# Patient Record
Sex: MALE | Race: ASIAN | NOT HISPANIC OR LATINO | Employment: FULL TIME | URBAN - METROPOLITAN AREA
[De-identification: names, ages, dates, MRNs, and addresses within clinical notes are randomized per-mention and may not be internally consistent; named-entity substitution may affect disease eponyms.]

---

## 2024-06-24 ENCOUNTER — PATIENT MESSAGE (OUTPATIENT)
Dept: INTERNAL MEDICINE | Facility: CLINIC | Age: 49
End: 2024-06-24

## 2024-06-24 ENCOUNTER — OFFICE VISIT (OUTPATIENT)
Dept: INTERNAL MEDICINE | Facility: CLINIC | Age: 49
End: 2024-06-24
Payer: COMMERCIAL

## 2024-06-24 VITALS
DIASTOLIC BLOOD PRESSURE: 60 MMHG | BODY MASS INDEX: 19.86 KG/M2 | WEIGHT: 105.19 LBS | OXYGEN SATURATION: 99 % | HEIGHT: 61 IN | SYSTOLIC BLOOD PRESSURE: 94 MMHG | HEART RATE: 74 BPM

## 2024-06-24 DIAGNOSIS — K20.80 ESOPHAGITIS, LOS ANGELES GRADE B: ICD-10-CM

## 2024-06-24 DIAGNOSIS — Z00.00 ANNUAL PHYSICAL EXAM: Primary | ICD-10-CM

## 2024-06-24 DIAGNOSIS — Z23 NEED FOR TDAP VACCINATION: ICD-10-CM

## 2024-06-24 DIAGNOSIS — Z00.00 ENCOUNTER FOR PREVENTIVE CARE: ICD-10-CM

## 2024-06-24 DIAGNOSIS — F51.01 PRIMARY INSOMNIA: ICD-10-CM

## 2024-06-24 DIAGNOSIS — F41.1 GENERALIZED ANXIETY DISORDER: ICD-10-CM

## 2024-06-24 DIAGNOSIS — Z76.89 ESTABLISHING CARE WITH NEW DOCTOR, ENCOUNTER FOR: ICD-10-CM

## 2024-06-24 DIAGNOSIS — Z12.11 SCREENING FOR MALIGNANT NEOPLASM OF COLON: ICD-10-CM

## 2024-06-24 DIAGNOSIS — J38.3 VOCAL CORD GRANULOMA: ICD-10-CM

## 2024-06-24 DIAGNOSIS — Z12.5 SCREENING FOR MALIGNANT NEOPLASM OF PROSTATE: ICD-10-CM

## 2024-06-24 PROCEDURE — 99999 PR PBB SHADOW E&M-EST. PATIENT-LVL IV: CPT | Mod: PBBFAC,,, | Performed by: INTERNAL MEDICINE

## 2024-06-24 PROCEDURE — 99204 OFFICE O/P NEW MOD 45 MIN: CPT | Mod: 25,S$GLB,, | Performed by: INTERNAL MEDICINE

## 2024-06-24 PROCEDURE — 3008F BODY MASS INDEX DOCD: CPT | Mod: CPTII,S$GLB,, | Performed by: INTERNAL MEDICINE

## 2024-06-24 PROCEDURE — 1159F MED LIST DOCD IN RCRD: CPT | Mod: CPTII,S$GLB,, | Performed by: INTERNAL MEDICINE

## 2024-06-24 PROCEDURE — 90471 IMMUNIZATION ADMIN: CPT | Mod: S$GLB,,, | Performed by: INTERNAL MEDICINE

## 2024-06-24 PROCEDURE — 90715 TDAP VACCINE 7 YRS/> IM: CPT | Mod: S$GLB,,, | Performed by: INTERNAL MEDICINE

## 2024-06-24 PROCEDURE — 3078F DIAST BP <80 MM HG: CPT | Mod: CPTII,S$GLB,, | Performed by: INTERNAL MEDICINE

## 2024-06-24 PROCEDURE — 3074F SYST BP LT 130 MM HG: CPT | Mod: CPTII,S$GLB,, | Performed by: INTERNAL MEDICINE

## 2024-06-24 RX ORDER — MIRTAZAPINE 7.5 MG/1
7.5 TABLET, FILM COATED ORAL NIGHTLY
Qty: 30 TABLET | Refills: 11 | Status: SHIPPED | OUTPATIENT
Start: 2024-06-24 | End: 2025-06-24

## 2024-06-24 RX ORDER — FAMOTIDINE 20 MG/1
40 TABLET, FILM COATED ORAL 2 TIMES DAILY PRN
Qty: 180 TABLET | Refills: 3 | Status: SHIPPED | OUTPATIENT
Start: 2024-06-24 | End: 2024-06-24 | Stop reason: SDUPTHER

## 2024-06-24 RX ORDER — FAMOTIDINE 20 MG/1
40 TABLET, FILM COATED ORAL 2 TIMES DAILY PRN
Qty: 180 TABLET | Refills: 3 | Status: SHIPPED | OUTPATIENT
Start: 2024-06-24

## 2024-06-24 NOTE — PROGRESS NOTES
INTERNAL MEDICINE CLINIC    Initial Visit to Establish Care    PRESENTING HISTORY     Previous PCP: No, Primary Doctor    Chief Complaint/Reason for Visit:     Chief Complaint   Patient presents with    Establish Care      History of Present Illness & ROS : Mr. Chato Reyes is a 48 y.o. male.      Review of Systems:  Review of Systems   Constitutional:  Negative for chills and fever.   Respiratory:  Negative for shortness of breath.    Cardiovascular:  Negative for chest pain.   Gastrointestinal:  Positive for heartburn. Negative for abdominal pain.   Musculoskeletal:  Negative for joint pain.   Psychiatric/Behavioral:  Negative for depression. The patient is nervous/anxious and has insomnia.        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       Past Surgical History:   Procedure Laterality Date    EYE SURGERY  2014 right  & 2016 left    Cataract surgery       Family History   Problem Relation Name Age of Onset    Diabetes [facility]     Hearing loss Father Amy     Hypertension Father Amy        Social History     Socioeconomic History    Marital status:    Tobacco Use    Smoking status: Never     Passive exposure: Never    Smokeless tobacco: Never   Substance and Sexual Activity    Alcohol use: Not Currently     Comment: Stopped consuming since Apr 2019    Drug use: Never    Sexual activity: Yes     Partners: Female     Birth control/protection: None   Social History Narrative    IT worker    Moved from [facility] to  2023.    : Cynthia    1 boy (14 years old as of 6/2024)     Social Determinants of Health     Financial Resource Strain: Low Risk  (6/17/2024)    Overall Financial Resource Strain (CARDIA)     Difficulty of Paying Living Expenses: Not hard at all   Food  Insecurity: No Food Insecurity (6/17/2024)    Hunger Vital Sign     Worried About Running Out of Food in the Last Year: Never true     Ran Out of Food in the Last Year: Never true   Physical Activity: Sufficiently Active (6/17/2024)    Exercise Vital Sign     Days of Exercise per Week: 5 days     Minutes of Exercise per Session: 60 min   Stress: Stress Concern Present (6/17/2024)    [employer] - Occupational Stress Questionnaire     Feeling of Stress : To some extent   Housing Stability: Unknown (6/17/2024)    Housing Stability Vital Sign     Unable to Pay for Housing in the Last Year: No       MEDICATIONS & ALLERGIES:     No current outpatient medications on file prior to visit.     No current facility-administered medications on file prior to visit.        Review of patient's allergies indicates:  No Known Allergies    Medications Reconciliation:   I have reconciled the patient's home medications with the patient/family. I have updated all changes.  Refer to After-Visit Medication List.    OBJECTIVE:     Vital Signs:  Vitals:    06/24/24 1511   BP: 94/60   Pulse: 74     Wt Readings from Last 3 Encounters:   06/24/24 1511 47.7 kg (105 lb 2.6 oz)     Body mass index is 19.85 kg/m².     Physical Exam:  General: Well developed, well nourished. No distress.  HEENT: Head is normocephalic, atraumatic; ears are normal.    Eyes: Clear conjunctiva.  Neck: Supple, symmetrical neck; trachea midline.  Lungs: Clear to auscultation bilaterally and normal respiratory effort.  Cardiovascular: Heart with regular rate and rhythm.    Extremities: No LE edema.    Abdomen: Abdomen is soft, non-tender non-distended with normal bowel sounds.  Musculoskeletal: Normal gait.   Genital:  Normal penis. Foreskin retractable.   No rash in the genital area.  Scrotum and epididymis normal. No inguinal hernia.  No inguinal nodes.   Rectal: No perianal lesions or rash. Digital exam: prostate 15 g, normal rectum.  Lymph  Nodes: No cervical, supraclavicular or axillary adenopathy.  Psychiatric: Normal affect. Alert.      ASSESSMENT & PLAN:   New patient who has not seen Primary Care Provider at [facility] for the past 3 years.  Patient is new to me. Medical, surgical, social, medication and allergy histories were obtained during this visit.    Annual phEstablishing care with new doctor, encounter forysical exam  Encounter for preventive care    - Reviewed and updated past and current medical problems.  Discussed treatment of current medical problems    -     HIV 1/2 Ag/Ab (4th Gen); Future; Expected date: 06/24/2024  -     Hepatitis C Antibody; Future; Expected date: 06/24/2024  -     Comprehensive Metabolic Panel; Future; Expected date: 06/24/2024  -     CBC Auto Differential; Future; Expected date: 06/24/2024  -     TSH; Future; Expected date: 06/24/2024  -     Hemoglobin A1C; Future; Expected date: 06/24/2024  -     Lipid Panel; Future; Expected date: 06/24/2024  -     PSA, Screening; Future; Expected date: 06/24/2024    Esophagitis, [place] grade B  EGD 4-9-2021 in [place]    -     Ambulatory referral/consult to Endo Procedure ; Future; Expected date: 06/25/2024  -     famotidine (PEPCID) 20 MG tablet; Take 2 tablets (40 mg total) by mouth 2 (two) times daily as needed for Heartburn.  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   Resolved per patient. No voice issues now.    Primary insomnia  Generalized anxiety disorder     Rx:  -     mirtazapine (REMERON) 7.5 MG Tab; Take 1 tablet (7.5 mg total) by mouth every evening.  Dispense: 30 tablet; Refill: 11        Titrate dose for effectiveness.    Screening for malignant neoplasm of colon  -     Ambulatory referral/consult to Endo Procedure ; Future; Expected date: 06/25/2024 for EGD and C-scope.    Preventive Health Maintenance:    Need for Tdap vaccination  -     (In Office Administered) Tdap  Vaccine    Return to Clinic for Follow Up with me:   1 Year.      Scheduled Follow-up :  Future Appointments   Date Time Provider Department Center   6/28/2024  8:20 AM LAB, APPOINTMENT [facility] LAB IM Parmjit y PCW   9/5/2024 10:40 AM PRE-ADMIT NURSE 2, ENDO -[facility]         After Visit Medication List :     Medication List            Accurate as of June 24, 2024 11:59 PM. If you have any questions, ask your nurse or doctor.                START taking these medications      cholecalciferol (vitamin D3) 50 mcg (2,000 unit) Cap capsule  Commonly known as: VITAMIN D3  Take 1 capsule (2,000 Units total) by mouth once daily.  Started by: Kamaljit Allison MD     famotidine 20 MG tablet  Commonly known as: PEPCID  Take 2 tablets (40 mg total) by mouth 2 (two) times daily as needed for Heartburn.  Started by: Kamaljit Allison MD     mirtazapine 7.5 MG Tab  Commonly known as: REMERON  Take 1 tablet (7.5 mg total) by mouth every evening.  Started by: Kamaljit Allison MD               Where to Get Your Medications        These medications were sent to [id number] - [place] - [address]  [address]  [address] [place] [address]      Hours: 24-hours Phone: [phone]   famotidine 20 MG tablet  mirtazapine 7.5 MG Tab       You can get these medications from any pharmacy    You don't need a prescription for these medications  cholecalciferol (vitamin D3) 50 mcg (2,000 unit) Cap capsule         Signing Physician:  Kamaljit Allison MD

## 2024-06-25 RX ORDER — ACETAMINOPHEN 500 MG
2000 TABLET ORAL DAILY
COMMUNITY
Start: 2024-06-25

## 2024-06-26 ENCOUNTER — PATIENT MESSAGE (OUTPATIENT)
Dept: INTERNAL MEDICINE | Facility: CLINIC | Age: 49
End: 2024-06-26
Payer: COMMERCIAL

## 2024-06-27 ENCOUNTER — PATIENT MESSAGE (OUTPATIENT)
Dept: INTERNAL MEDICINE | Facility: CLINIC | Age: 49
End: 2024-06-27
Payer: COMMERCIAL

## 2024-06-28 ENCOUNTER — LAB VISIT (OUTPATIENT)
Dept: LAB | Facility: HOSPITAL | Age: 49
End: 2024-06-28
Attending: INTERNAL MEDICINE
Payer: COMMERCIAL

## 2024-06-28 DIAGNOSIS — Z12.5 SCREENING FOR MALIGNANT NEOPLASM OF PROSTATE: ICD-10-CM

## 2024-06-28 DIAGNOSIS — Z76.89 ESTABLISHING CARE WITH NEW DOCTOR, ENCOUNTER FOR: ICD-10-CM

## 2024-06-28 DIAGNOSIS — Z00.00 ENCOUNTER FOR PREVENTIVE CARE: ICD-10-CM

## 2024-06-28 DIAGNOSIS — Z00.00 ANNUAL PHYSICAL EXAM: ICD-10-CM

## 2024-06-28 PROBLEM — E78.5 DYSLIPIDEMIA: Status: ACTIVE | Noted: 2024-06-28

## 2024-06-28 LAB
ALBUMIN SERPL BCP-MCNC: 3.9 G/DL (ref 3.5–5.2)
ALP SERPL-CCNC: 70 U/L (ref 55–135)
ALT SERPL W/O P-5'-P-CCNC: 10 U/L (ref 10–44)
ANION GAP SERPL CALC-SCNC: 10 MMOL/L (ref 8–16)
AST SERPL-CCNC: 20 U/L (ref 10–40)
BASOPHILS # BLD AUTO: 0.05 K/UL (ref 0–0.2)
BASOPHILS NFR BLD: 0.7 % (ref 0–1.9)
BILIRUB SERPL-MCNC: 0.3 MG/DL (ref 0.1–1)
BUN SERPL-MCNC: 13 MG/DL (ref 6–20)
CALCIUM SERPL-MCNC: 10.2 MG/DL (ref 8.7–10.5)
CHLORIDE SERPL-SCNC: 109 MMOL/L (ref 95–110)
CHOLEST SERPL-MCNC: 204 MG/DL (ref 120–199)
CHOLEST/HDLC SERPL: 5.2 {RATIO} (ref 2–5)
CO2 SERPL-SCNC: 24 MMOL/L (ref 23–29)
COMPLEXED PSA SERPL-MCNC: 0.87 NG/ML (ref 0–4)
CREAT SERPL-MCNC: 0.9 MG/DL (ref 0.5–1.4)
DIFFERENTIAL METHOD BLD: ABNORMAL
EOSINOPHIL # BLD AUTO: 0.3 K/UL (ref 0–0.5)
EOSINOPHIL NFR BLD: 4.3 % (ref 0–8)
ERYTHROCYTE [DISTWIDTH] IN BLOOD BY AUTOMATED COUNT: 12.9 % (ref 11.5–14.5)
EST. GFR  (NO RACE VARIABLE): >60 ML/MIN/1.73 M^2
ESTIMATED AVG GLUCOSE: 111 MG/DL (ref 68–131)
GLUCOSE SERPL-MCNC: 114 MG/DL (ref 70–110)
HBA1C MFR BLD: 5.5 % (ref 4–5.6)
HCT VFR BLD AUTO: 46.4 % (ref 40–54)
HCV AB SERPL QL IA: NORMAL
HDLC SERPL-MCNC: 39 MG/DL (ref 40–75)
HDLC SERPL: 19.1 % (ref 20–50)
HGB BLD-MCNC: 14.7 G/DL (ref 14–18)
HIV 1+2 AB+HIV1 P24 AG SERPL QL IA: NORMAL
IMM GRANULOCYTES # BLD AUTO: 0.01 K/UL (ref 0–0.04)
IMM GRANULOCYTES NFR BLD AUTO: 0.1 % (ref 0–0.5)
LDLC SERPL CALC-MCNC: 129.2 MG/DL (ref 63–159)
LYMPHOCYTES # BLD AUTO: 2.6 K/UL (ref 1–4.8)
LYMPHOCYTES NFR BLD: 38 % (ref 18–48)
MCH RBC QN AUTO: 28.8 PG (ref 27–31)
MCHC RBC AUTO-ENTMCNC: 31.7 G/DL (ref 32–36)
MCV RBC AUTO: 91 FL (ref 82–98)
MONOCYTES # BLD AUTO: 0.5 K/UL (ref 0.3–1)
MONOCYTES NFR BLD: 7.5 % (ref 4–15)
NEUTROPHILS # BLD AUTO: 3.4 K/UL (ref 1.8–7.7)
NEUTROPHILS NFR BLD: 49.4 % (ref 38–73)
NONHDLC SERPL-MCNC: 165 MG/DL
NRBC BLD-RTO: 0 /100 WBC
PLATELET # BLD AUTO: 316 K/UL (ref 150–450)
PMV BLD AUTO: 8.7 FL (ref 9.2–12.9)
POTASSIUM SERPL-SCNC: 4.6 MMOL/L (ref 3.5–5.1)
PROT SERPL-MCNC: 7.2 G/DL (ref 6–8.4)
RBC # BLD AUTO: 5.11 M/UL (ref 4.6–6.2)
SODIUM SERPL-SCNC: 143 MMOL/L (ref 136–145)
TRIGL SERPL-MCNC: 179 MG/DL (ref 30–150)
TSH SERPL DL<=0.005 MIU/L-ACNC: 1.49 UIU/ML (ref 0.4–4)
WBC # BLD AUTO: 6.94 K/UL (ref 3.9–12.7)

## 2024-06-28 PROCEDURE — 87389 HIV-1 AG W/HIV-1&-2 AB AG IA: CPT | Performed by: INTERNAL MEDICINE

## 2024-06-28 PROCEDURE — 84153 ASSAY OF PSA TOTAL: CPT | Performed by: INTERNAL MEDICINE

## 2024-06-28 PROCEDURE — 80053 COMPREHEN METABOLIC PANEL: CPT | Performed by: INTERNAL MEDICINE

## 2024-06-28 PROCEDURE — 84443 ASSAY THYROID STIM HORMONE: CPT | Performed by: INTERNAL MEDICINE

## 2024-06-28 PROCEDURE — 86803 HEPATITIS C AB TEST: CPT | Performed by: INTERNAL MEDICINE

## 2024-06-28 PROCEDURE — 85025 COMPLETE CBC W/AUTO DIFF WBC: CPT | Performed by: INTERNAL MEDICINE

## 2024-06-28 PROCEDURE — 80061 LIPID PANEL: CPT | Performed by: INTERNAL MEDICINE

## 2024-06-28 PROCEDURE — 36415 COLL VENOUS BLD VENIPUNCTURE: CPT | Performed by: INTERNAL MEDICINE

## 2024-06-28 PROCEDURE — 83036 HEMOGLOBIN GLYCOSYLATED A1C: CPT | Performed by: INTERNAL MEDICINE

## 2024-07-04 ENCOUNTER — PATIENT MESSAGE (OUTPATIENT)
Dept: INTERNAL MEDICINE | Facility: CLINIC | Age: 49
End: 2024-07-04
Payer: COMMERCIAL

## 2024-07-05 DIAGNOSIS — F41.1 GENERALIZED ANXIETY DISORDER: ICD-10-CM

## 2024-07-05 DIAGNOSIS — F51.01 PRIMARY INSOMNIA: ICD-10-CM

## 2024-07-05 RX ORDER — MIRTAZAPINE 7.5 MG/1
7.5 TABLET, FILM COATED ORAL NIGHTLY
Qty: 90 TABLET | Refills: 3 | Status: SHIPPED | OUTPATIENT
Start: 2024-07-24

## 2024-07-05 NOTE — TELEPHONE ENCOUNTER
mirtazapine (REMERON) 7.5 MG Tab (Future) 7.5 mg Oral Nightly 07/24/2024  -- --    90 tablet 3 ordered -- -- Kamaljit Allison MD              Dose, Route, Frequency: 7.5 mg, Oral, NightlyDx Associated: Ordered Date & Time: 07/05/2024 0957Start: 07/24/2024Pharmacy: [facility] #[id number] - [place], [place] - [address]Adh: Report       Ordering Department: [facility] INTERNAL MEDICINE  Order Details: Take 1 tablet (7.5 mg total) by mouth every evening. Dispense: 90 tablet, Refills: 3 ordered

## 2024-07-08 DIAGNOSIS — F51.01 PRIMARY INSOMNIA: ICD-10-CM

## 2024-07-08 DIAGNOSIS — F41.1 GENERALIZED ANXIETY DISORDER: ICD-10-CM

## 2024-07-08 RX ORDER — MIRTAZAPINE 7.5 MG/1
7.5 TABLET, FILM COATED ORAL NIGHTLY
Qty: 90 TABLET | Refills: 3 | Status: SHIPPED | OUTPATIENT
Start: 2024-07-08

## 2024-07-08 NOTE — TELEPHONE ENCOUNTER
mirtazapine (REMERON) 7.5 MG Tab 7.5 mg Oral Nightly 07/08/2024  -- --    90 tablet 3 ordered -- -- Kamaljit Allison MD              Dose, Route, Frequency: 7.5 mg, Oral, NightlyDx Associated: Ordered Date & Time: 07/08/2024 1730Start: 07/08/2024Pharmacy: [facility] #[id number] - [place], [place] - [address] AT [facility] [address]: Report             Change  Reorder  Discontinue  Ordering Department: [facility] INTERNAL MEDICINE  Order Details: Take 1 tablet (7.5 mg total) by mouth every evening. Dispense: 90 tablet, Refills: 3 ordered

## 2024-08-15 DIAGNOSIS — K20.80 ESOPHAGITIS, LOS ANGELES GRADE B: ICD-10-CM

## 2024-08-15 RX ORDER — FAMOTIDINE 40 MG/1
40 TABLET, FILM COATED ORAL 2 TIMES DAILY PRN
Qty: 180 TABLET | Refills: 3 | Status: SHIPPED | OUTPATIENT
Start: 2024-08-15

## 2024-08-15 NOTE — TELEPHONE ENCOUNTER
Care Due:                  Date            Visit Type   Department     Provider  --------------------------------------------------------------------------------                                NP -                              PRIMARY      [facility] INTERNAL  Last Visit: 06-      CARE ([facility])   MEDICINE       Kamaljit Allison  Next Visit: None Scheduled  None         None Found                                                            Last  Test          Frequency    Reason                     Performed    Due Date  --------------------------------------------------------------------------------    Vitamin D...  12 months..  cholecalciferol,.........  Not Found    Overdue    [employer] Embedded Care Due Messages. Reference number: [id number].   8/15/2024 5:28:02 PM CDT

## 2024-09-05 ENCOUNTER — CLINICAL SUPPORT (OUTPATIENT)
Dept: ENDOSCOPY | Facility: HOSPITAL | Age: 49
End: 2024-09-05
Attending: INTERNAL MEDICINE
Payer: COMMERCIAL

## 2024-09-05 ENCOUNTER — TELEPHONE (OUTPATIENT)
Dept: ENDOSCOPY | Facility: HOSPITAL | Age: 49
End: 2024-09-05

## 2024-09-05 VITALS — WEIGHT: 105 LBS | BODY MASS INDEX: 19.83 KG/M2 | HEIGHT: 61 IN

## 2024-09-05 DIAGNOSIS — Z12.11 SCREENING FOR MALIGNANT NEOPLASM OF COLON: ICD-10-CM

## 2024-09-05 DIAGNOSIS — K20.80 ESOPHAGITIS, LOS ANGELES GRADE B: ICD-10-CM

## 2024-09-05 RX ORDER — SODIUM, POTASSIUM,MAG SULFATES 17.5-3.13G
1 SOLUTION, RECONSTITUTED, ORAL ORAL DAILY
Qty: 1 KIT | Refills: 0 | Status: SHIPPED | OUTPATIENT
Start: 2024-09-05 | End: 2024-09-07

## 2024-09-05 NOTE — TELEPHONE ENCOUNTER
----- Message from Kiara Glaser sent at 9/5/2024 12:38 PM CDT -----  Regarding: FW: sooner appt wanted  Contact: pt @ [phone]    ----- Message -----  From: Blanca Abdalla  Sent: 9/5/2024  12:24 PM CDT  To: [facility] Endoscopy Schedulers  Subject: sooner appt wanted                               Pt is wanting be scheduled for procedure sooner with any provider. Please call to advise further. Thank you for all you are doing

## 2024-09-05 NOTE — TELEPHONE ENCOUNTER
Spoke to patient to reschedule procedure(s) Colonoscopy/EGD       Physician to perform procedure(s) Dr. ANGELIA Bradley  Date of Procedure (s) 9/10/24  Arrival Time 9:45 AM  Time of Procedure(s) 10:45 AM   Location of Procedure(s) [place] 4th Floor  Type of Rx Prep sent to patient: Suprep  Instructions provided to patient via [facility]    Patient was informed on the following information and verbalized understanding. Screening questionnaire reviewed with patient and complete. If procedure requires anesthesia, a responsible adult needs to be present to accompany the patient home, patient cannot drive after receiving anesthesia. Appointment details are tentative, especially check-in time. Patient will receive a prep-op call 7 days prior to confirm check-in time for procedure. If applicable the patient should contact their pharmacy to verify Rx for procedure prep is ready for pick-up. Patient was advised to call the scheduling department at [phone] if pharmacy states no Rx is available. Patient was advised to call the endoscopy scheduling department if any questions or concerns arise.      [facility] Endoscopy Scheduling Department

## 2024-09-05 NOTE — TELEPHONE ENCOUNTER
Spoke to Argelia to schedule procedure(s) Colonoscopy/EGD       Physician to perform procedure(s) Dr. JULIO CÉSAR Hopkins  Date of Procedure (s) 9/12/24  Arrival Time 7:00 AM  Time of Procedure(s) 8:00 AM   Location of Procedure(s) [place] 2nd Floor  Type of Rx Prep sent to patient: Suprep  Instructions provided to patient via [facility]    Patient was informed on the following information and verbalized understanding. Screening questionnaire reviewed with patient and complete. If procedure requires anesthesia, a responsible adult needs to be present to accompany the patient home, patient cannot drive after receiving anesthesia. Appointment details are tentative, especially check-in time. Patient will receive a prep-op call 7 days prior to confirm check-in time for procedure. If applicable the patient should contact their pharmacy to verify Rx for procedure prep is ready for pick-up. Patient was advised to call the scheduling department at [phone] if pharmacy states no Rx is available. Patient was advised to call the endoscopy scheduling department if any questions or concerns arise.      [facility] Endoscopy Scheduling Department

## 2024-10-22 DIAGNOSIS — F41.1 GENERALIZED ANXIETY DISORDER: ICD-10-CM

## 2024-10-22 DIAGNOSIS — F51.01 PRIMARY INSOMNIA: ICD-10-CM

## 2024-10-22 RX ORDER — MIRTAZAPINE 7.5 MG/1
7.5 TABLET, FILM COATED ORAL NIGHTLY
Qty: 90 TABLET | Refills: 3 | Status: SHIPPED | OUTPATIENT
Start: 2024-10-22

## 2024-10-22 NOTE — TELEPHONE ENCOUNTER
Care Due:                  Date            Visit Type   Department     Provider  --------------------------------------------------------------------------------                                NP -                              PRIMARY      [facility] INTERNAL  Last Visit: 06-      CARE ([facility])   MEDICINE       Kamaljit Allison  Next Visit: None Scheduled  None         None Found                                                            Last  Test          Frequency    Reason                     Performed    Due Date  --------------------------------------------------------------------------------    Vitamin D...  12 months..  cholecalciferol,.........  Not Found    Overdue    [employer] Embedded Care Due Messages. Reference number: [id number].   10/22/2024 11:00:54 AM CDT

## 2024-12-16 ENCOUNTER — PATIENT MESSAGE (OUTPATIENT)
Dept: ADMINISTRATIVE | Facility: HOSPITAL | Age: 49
End: 2024-12-16
Payer: COMMERCIAL

## 2024-12-18 ENCOUNTER — PATIENT OUTREACH (OUTPATIENT)
Dept: ADMINISTRATIVE | Facility: HOSPITAL | Age: 49
End: 2024-12-18
Payer: COMMERCIAL

## 2024-12-18 NOTE — PROGRESS NOTES
Chart reviewed and updated. Reconciled immunizations.  Colonoscopy never done forwarding for Fit kit.      Marie CANO, Medical Assistant  Panel Care Coordinator  [facility]

## 2025-01-14 ENCOUNTER — PATIENT MESSAGE (OUTPATIENT)
Dept: INTERNAL MEDICINE | Facility: CLINIC | Age: 50
End: 2025-01-14
Payer: COMMERCIAL

## 2025-01-15 ENCOUNTER — TELEPHONE (OUTPATIENT)
Dept: INTERNAL MEDICINE | Facility: CLINIC | Age: 50
End: 2025-01-15
Payer: COMMERCIAL

## 2025-01-15 DIAGNOSIS — K20.80 ESOPHAGITIS, LOS ANGELES GRADE B: Primary | ICD-10-CM

## 2025-01-15 DIAGNOSIS — Z12.11 SCREENING FOR MALIGNANT NEOPLASM OF COLON: ICD-10-CM

## 2025-01-15 NOTE — TELEPHONE ENCOUNTER
I have placed a referral for screening colonoscopy and EGD (upper and lower endoscopy)    Please call the Endoscopy Department to schedule your appointment:    [facility] [place] ([facility])  [facility]                                                              [phone]

## 2025-02-01 DIAGNOSIS — F41.1 GENERALIZED ANXIETY DISORDER: ICD-10-CM

## 2025-02-01 DIAGNOSIS — F51.01 PRIMARY INSOMNIA: ICD-10-CM

## 2025-02-01 RX ORDER — MIRTAZAPINE 7.5 MG/1
7.5 TABLET, FILM COATED ORAL NIGHTLY
Qty: 90 TABLET | Refills: 1 | Status: SHIPPED | OUTPATIENT
Start: 2025-02-01

## 2025-02-01 NOTE — TELEPHONE ENCOUNTER
Care Due:                  Date            Visit Type   Department     Provider  --------------------------------------------------------------------------------                                NP -                              PRIMARY      [facility] INTERNAL  Last Visit: 06-      CARE ([facility])   MEDICINE       Kamaljit Allison  Next Visit: None Scheduled  None         None Found                                                            Last  Test          Frequency    Reason                     Performed    Due Date  --------------------------------------------------------------------------------    Vitamin D...  12 months..  cholecalciferol,.........  Not Found    Overdue    [employer] Embedded Care Due Messages. Reference number: [id number].   2/01/2025 12:19:21 PM CST

## 2025-02-17 DIAGNOSIS — K20.80 ESOPHAGITIS, LOS ANGELES GRADE B: ICD-10-CM

## 2025-02-18 RX ORDER — FAMOTIDINE 40 MG/1
40 TABLET, FILM COATED ORAL 2 TIMES DAILY PRN
Qty: 180 TABLET | Refills: 3 | Status: SHIPPED | OUTPATIENT
Start: 2025-02-18

## 2025-02-18 NOTE — TELEPHONE ENCOUNTER
No care due was identified.  [facility] Embedded Care Due Messages. Reference number: [id number].   2/17/2025 7:52:37 PM CST

## 2025-03-14 ENCOUNTER — PATIENT MESSAGE (OUTPATIENT)
Dept: INTERNAL MEDICINE | Facility: CLINIC | Age: 50
End: 2025-03-14
Payer: COMMERCIAL

## 2025-03-14 ENCOUNTER — CLINICAL SUPPORT (OUTPATIENT)
Dept: ENDOSCOPY | Facility: HOSPITAL | Age: 50
End: 2025-03-14
Attending: INTERNAL MEDICINE
Payer: COMMERCIAL

## 2025-03-14 ENCOUNTER — TELEPHONE (OUTPATIENT)
Dept: GASTROENTEROLOGY | Facility: CLINIC | Age: 50
End: 2025-03-14
Payer: COMMERCIAL

## 2025-03-14 VITALS — WEIGHT: 110 LBS | BODY MASS INDEX: 20.77 KG/M2 | HEIGHT: 61 IN

## 2025-03-14 DIAGNOSIS — Z12.11 SCREENING FOR MALIGNANT NEOPLASM OF COLON: ICD-10-CM

## 2025-03-14 DIAGNOSIS — K20.80 ESOPHAGITIS, LOS ANGELES GRADE B: ICD-10-CM

## 2025-03-14 NOTE — TELEPHONE ENCOUNTER
----- Message from Mauricio sent at 3/14/2025  4:42 PM CDT -----  Regarding: Cancel Procedure  Contact: [phone]  Hi,Who called:The pt Reason:Pt called to cancel procedure for 03/19/25Provider's name:Dr. HopkinsAdditional Information:Thank you.

## 2025-03-14 NOTE — TELEPHONE ENCOUNTER
----- Message from Mauricio sent at 3/14/2025  4:42 PM CDT -----  Regarding: Cancel Procedure  Contact: [phone]  Hi,Who called:The pt Reason:Pt called to cancel procedure for 03/19/25Provider's name:Dr. HopkinsAdditional Information:Thank you.

## 2025-03-17 ENCOUNTER — TELEPHONE (OUTPATIENT)
Dept: ENDOSCOPY | Facility: HOSPITAL | Age: 50
End: 2025-03-17
Payer: COMMERCIAL

## 2025-03-17 NOTE — TELEPHONE ENCOUNTER
Spoke to pt in regards to EGD on 3/19. Pt wants to cancel procedure due to cost. Procedure removed from schedule.

## 2025-05-01 DIAGNOSIS — F41.1 GENERALIZED ANXIETY DISORDER: ICD-10-CM

## 2025-05-01 DIAGNOSIS — F51.01 PRIMARY INSOMNIA: ICD-10-CM

## 2025-05-01 RX ORDER — MIRTAZAPINE 7.5 MG/1
7.5 TABLET, FILM COATED ORAL NIGHTLY
Qty: 90 TABLET | Refills: 0 | Status: SHIPPED | OUTPATIENT
Start: 2025-05-01

## 2025-05-02 NOTE — TELEPHONE ENCOUNTER
Care Due:                  Date            Visit Type   Department     Provider  --------------------------------------------------------------------------------                                NP -                              PRIMARY      [facility] INTERNAL  Last Visit: 06-      CARE ([facility])   GRISELDA Allison                               -                              PRIMARY      [facility] INTERNAL  Next Visit: 06-      CARE ([facility])   [facility]       Kamaljit Allison                                                            Last  Test          Frequency    Reason                     Performed    Due Date  --------------------------------------------------------------------------------    CBC.........  12 months..  mirtazapine..............  06- 06-    CMP.........  12 months..  cholecalciferol,,          06- 06-                             famotidine, mirtazapine..    Vitamin D...  12 months..  cholecalciferol,.........  Not Found    Overdue    [employer] Embedded Care Due Messages. Reference number: [id number].   5/01/2025 8:56:54 PM CDT

## 2025-06-30 ENCOUNTER — OFFICE VISIT (OUTPATIENT)
Dept: INTERNAL MEDICINE | Facility: CLINIC | Age: 50
End: 2025-06-30
Payer: COMMERCIAL

## 2025-06-30 ENCOUNTER — LAB VISIT (OUTPATIENT)
Dept: LAB | Facility: HOSPITAL | Age: 50
End: 2025-06-30
Attending: INTERNAL MEDICINE
Payer: COMMERCIAL

## 2025-06-30 ENCOUNTER — PATIENT MESSAGE (OUTPATIENT)
Dept: INTERNAL MEDICINE | Facility: CLINIC | Age: 50
End: 2025-06-30

## 2025-06-30 ENCOUNTER — RESULTS FOLLOW-UP (OUTPATIENT)
Dept: INTERNAL MEDICINE | Facility: CLINIC | Age: 50
End: 2025-06-30

## 2025-06-30 VITALS
BODY MASS INDEX: 20.47 KG/M2 | WEIGHT: 108.44 LBS | DIASTOLIC BLOOD PRESSURE: 82 MMHG | OXYGEN SATURATION: 99 % | HEART RATE: 66 BPM | HEIGHT: 61 IN | SYSTOLIC BLOOD PRESSURE: 100 MMHG

## 2025-06-30 DIAGNOSIS — K20.80 ESOPHAGITIS, LOS ANGELES GRADE B: ICD-10-CM

## 2025-06-30 DIAGNOSIS — Z00.00 ANNUAL PHYSICAL EXAM: Primary | ICD-10-CM

## 2025-06-30 DIAGNOSIS — Z12.5 SCREENING FOR MALIGNANT NEOPLASM OF PROSTATE: ICD-10-CM

## 2025-06-30 DIAGNOSIS — F51.01 PRIMARY INSOMNIA: ICD-10-CM

## 2025-06-30 DIAGNOSIS — A04.8 H. PYLORI INFECTION: ICD-10-CM

## 2025-06-30 DIAGNOSIS — Z12.11 SCREENING FOR MALIGNANT NEOPLASM OF COLON: ICD-10-CM

## 2025-06-30 DIAGNOSIS — J38.3 VOCAL CORD GRANULOMA: ICD-10-CM

## 2025-06-30 DIAGNOSIS — E78.2 MIXED HYPERLIPIDEMIA: ICD-10-CM

## 2025-06-30 DIAGNOSIS — F41.1 GENERALIZED ANXIETY DISORDER: ICD-10-CM

## 2025-06-30 DIAGNOSIS — Z00.00 ANNUAL PHYSICAL EXAM: ICD-10-CM

## 2025-06-30 LAB
ABSOLUTE EOSINOPHIL (OHS): 0.14 K/UL
ABSOLUTE MONOCYTE (OHS): 0.44 K/UL (ref 0.3–1)
ABSOLUTE NEUTROPHIL COUNT (OHS): 4.53 K/UL (ref 1.8–7.7)
ALBUMIN SERPL BCP-MCNC: 4.5 G/DL (ref 3.5–5.2)
ALP SERPL-CCNC: 63 UNIT/L (ref 40–150)
ALT SERPL W/O P-5'-P-CCNC: 14 UNIT/L (ref 10–44)
ANION GAP (OHS): 10 MMOL/L (ref 8–16)
AST SERPL-CCNC: 17 UNIT/L (ref 11–45)
BASOPHILS # BLD AUTO: 0.03 K/UL
BASOPHILS NFR BLD AUTO: 0.4 %
BILIRUB SERPL-MCNC: 0.5 MG/DL (ref 0.1–1)
BUN SERPL-MCNC: 9 MG/DL (ref 6–20)
CALCIUM SERPL-MCNC: 9.6 MG/DL (ref 8.7–10.5)
CHLORIDE SERPL-SCNC: 105 MMOL/L (ref 95–110)
CO2 SERPL-SCNC: 25 MMOL/L (ref 23–29)
CREAT SERPL-MCNC: 0.7 MG/DL (ref 0.5–1.4)
EAG (OHS): 105 MG/DL (ref 68–131)
ERYTHROCYTE [DISTWIDTH] IN BLOOD BY AUTOMATED COUNT: 13 % (ref 11.5–14.5)
GFR SERPLBLD CREATININE-BSD FMLA CKD-EPI: >60 ML/MIN/1.73/M2
GLUCOSE SERPL-MCNC: 89 MG/DL (ref 70–110)
HBA1C MFR BLD: 5.3 % (ref 4–5.6)
HBV CORE AB SERPL QL IA: NORMAL
HBV SURFACE AB SER-ACNC: <3 MIU/ML
HBV SURFACE AB SERPL IA-ACNC: NORMAL M[IU]/ML
HBV SURFACE AG SERPL QL IA: NORMAL
HCT VFR BLD AUTO: 42.3 % (ref 40–54)
HGB BLD-MCNC: 14 GM/DL (ref 14–18)
IMM GRANULOCYTES # BLD AUTO: 0.02 K/UL (ref 0–0.04)
IMM GRANULOCYTES NFR BLD AUTO: 0.3 % (ref 0–0.5)
LYMPHOCYTES # BLD AUTO: 2.39 K/UL (ref 1–4.8)
MCH RBC QN AUTO: 28.5 PG (ref 27–31)
MCHC RBC AUTO-ENTMCNC: 33.1 G/DL (ref 32–36)
MCV RBC AUTO: 86 FL (ref 82–98)
NUCLEATED RBC (/100WBC) (OHS): 0 /100 WBC
PLATELET # BLD AUTO: 317 K/UL (ref 150–450)
PMV BLD AUTO: 8.8 FL (ref 9.2–12.9)
POTASSIUM SERPL-SCNC: 4.4 MMOL/L (ref 3.5–5.1)
PROT SERPL-MCNC: 7.6 GM/DL (ref 6–8.4)
PSA SERPL-MCNC: 0.91 NG/ML
RBC # BLD AUTO: 4.91 M/UL (ref 4.6–6.2)
RELATIVE EOSINOPHIL (OHS): 1.9 %
RELATIVE LYMPHOCYTE (OHS): 31.7 % (ref 18–48)
RELATIVE MONOCYTE (OHS): 5.8 % (ref 4–15)
RELATIVE NEUTROPHIL (OHS): 59.9 % (ref 38–73)
SODIUM SERPL-SCNC: 140 MMOL/L (ref 136–145)
TSH SERPL-ACNC: 1.11 UIU/ML (ref 0.4–4)
WBC # BLD AUTO: 7.55 K/UL (ref 3.9–12.7)

## 2025-06-30 PROCEDURE — 36415 COLL VENOUS BLD VENIPUNCTURE: CPT

## 2025-06-30 PROCEDURE — 3008F BODY MASS INDEX DOCD: CPT | Mod: CPTII,S$GLB,, | Performed by: INTERNAL MEDICINE

## 2025-06-30 PROCEDURE — 86704 HEP B CORE ANTIBODY TOTAL: CPT

## 2025-06-30 PROCEDURE — 84443 ASSAY THYROID STIM HORMONE: CPT

## 2025-06-30 PROCEDURE — 87340 HEPATITIS B SURFACE AG IA: CPT

## 2025-06-30 PROCEDURE — 86677 HELICOBACTER PYLORI ANTIBODY: CPT

## 2025-06-30 PROCEDURE — 99999 PR PBB SHADOW E&M-EST. PATIENT-LVL III: CPT | Mod: PBBFAC,,, | Performed by: INTERNAL MEDICINE

## 2025-06-30 PROCEDURE — 83036 HEMOGLOBIN GLYCOSYLATED A1C: CPT

## 2025-06-30 PROCEDURE — 3079F DIAST BP 80-89 MM HG: CPT | Mod: CPTII,S$GLB,, | Performed by: INTERNAL MEDICINE

## 2025-06-30 PROCEDURE — 84153 ASSAY OF PSA TOTAL: CPT

## 2025-06-30 PROCEDURE — 85025 COMPLETE CBC W/AUTO DIFF WBC: CPT

## 2025-06-30 PROCEDURE — 99214 OFFICE O/P EST MOD 30 MIN: CPT | Mod: S$GLB,,, | Performed by: INTERNAL MEDICINE

## 2025-06-30 PROCEDURE — 86706 HEP B SURFACE ANTIBODY: CPT

## 2025-06-30 PROCEDURE — 80053 COMPREHEN METABOLIC PANEL: CPT

## 2025-06-30 PROCEDURE — 3074F SYST BP LT 130 MM HG: CPT | Mod: CPTII,S$GLB,, | Performed by: INTERNAL MEDICINE

## 2025-06-30 RX ORDER — MIRTAZAPINE 7.5 MG/1
7.5 TABLET, FILM COATED ORAL NIGHTLY
Qty: 90 TABLET | Refills: 3 | Status: SHIPPED | OUTPATIENT
Start: 2025-06-30

## 2025-06-30 RX ORDER — FAMOTIDINE 40 MG/1
40 TABLET, FILM COATED ORAL 2 TIMES DAILY PRN
Qty: 180 TABLET | Refills: 3 | Status: SHIPPED | OUTPATIENT
Start: 2025-06-30

## 2025-06-30 RX ORDER — PRAVASTATIN SODIUM 10 MG/1
10 TABLET ORAL
Qty: 40 TABLET | Refills: 3 | Status: SHIPPED | OUTPATIENT
Start: 2025-06-30

## 2025-06-30 RX ORDER — BUSPIRONE HYDROCHLORIDE 10 MG/1
10 TABLET ORAL 2 TIMES DAILY PRN
Qty: 60 TABLET | Refills: 11 | Status: SHIPPED | OUTPATIENT
Start: 2025-06-30

## 2025-06-30 NOTE — PROGRESS NOTES
INTERNAL MEDICINE CLINIC  Follow-up Visit Progress Note     PRESENTING HISTORY     PCP: Kamaljit Allison MD    Last Clinic Visit with me:  6-24-24    Current Chief Complaint/Problem:    Chief Complaint   Patient presents with    Annual Exam      History of Present Illness & ROS: Mr. Chato Reyes is a 49 y.o. male.    Review of Systems   Constitutional:  Negative for chills and fever.   Cardiovascular:  Negative for chest pain and leg swelling.   Gastrointestinal:  Negative for abdominal pain and heartburn.   Musculoskeletal:  Negative for joint pain.   Skin:  Negative for rash.   Psychiatric/Behavioral:  The patient is nervous/anxious (sometimes at work). The patient does not have insomnia (Improved with treatment).          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       Past Surgical History:   Procedure Laterality Date    EYE SURGERY  2014 right  & 2016 left    Cataract surgery       Family History   Problem Relation Name Age of Onset    Diabetes Mother Sampuranam     Hearing loss Father Amy     Hypertension Father Amy        Social History     Socioeconomic History    Marital status:    Tobacco Use    Smoking status: Never     Passive exposure: Never    Smokeless tobacco: Never   Substance and Sexual Activity    Alcohol use: Not Currently     Comment: Stopped consuming since Apr 2019    Drug use: Never    Sexual activity: Yes     Partners: Female     Birth control/protection: None   Social History Narrative    IT worker    Moved from [facility] to  2023.    : [place]    1 boy (14 years old as of 6/2024)     Social Drivers of Health     Financial Resource Strain: Low Risk  (6/24/2025)    Overall Financial Resource Strain (CARDIA)     Difficulty  of Paying Living Expenses: Not very hard   Food Insecurity: No Food Insecurity (6/24/2025)    Hunger Vital Sign     Worried About Running Out of Food in the Last Year: Never true     Ran Out of Food in the Last Year: Never true   Transportation Needs: No Transportation Needs (6/24/2025)    PRAPARE - Transportation     Lack of Transportation (Medical): No     Lack of Transportation (Non-Medical): No   Physical Activity: Sufficiently Active (6/24/2025)    Exercise Vital Sign     Days of Exercise per Week: 5 days     Minutes of Exercise per Session: 100 min   Stress: Stress Concern Present (6/24/2025)    [employer] - Occupational Stress Questionnaire     Feeling of Stress : To some extent   Housing Stability: Low Risk  (6/24/2025)    Housing Stability Vital Sign     Unable to Pay for Housing in the Last Year: No     Homeless in the Last Year: No       MEDICATIONS & ALLERGIES:     Medications Ordered Prior to Encounter[1]     Review of patient's allergies indicates:  No Known Allergies    Medications Reconciliation:   I have reconciled the patient's home medications and discharge medications with the patient/family. I have updated all changes.  Refer to After-Visit Medication List.    OBJECTIVE:     Vital Signs:  Vitals:    06/30/25 1304   BP: 100/82   Pulse: 66     Wt Readings from Last 3 Encounters:   06/30/25 1304 49.2 kg (108 lb 7.5 oz)   03/14/25 1307 49.9 kg (110 lb)   09/05/24 1050 47.6 kg (105 lb)     Body mass index is 20.49 kg/m².     Physical Exam:  General: No distress.  HEENT: Head is normocephalic, atraumatic; ears are normal.    Eyes: Clear conjunctiva.  Neck: Supple, symmetrical neck; trachea midline.  Lungs: Clear to auscultation bilaterally and normal respiratory effort.  Cardiovascular: Heart with regular rate and rhythm.    Extremities: No LE edema.    Abdomen: Abdomen is soft, non-tender non-distended with normal bowel sounds.  Musculoskeletal: Normal gait.   Genital:   Normal penis.    No rash in the genital area.  Scrotum and epididymis normal. No inguinal hernia.  No inguinal nodes.   Rectal: No perianal lesions or rash. Digital exam: 10 g prostate smooth, normal rectum.  Lymph Nodes: No cervical, supraclavicular or axillary adenopathy.  Psychiatric: Normal affect. Alert.      Laboratory  Lab Results   Component Value Date    WBC 7.55 06/30/2025    HGB 14.0 06/30/2025    HCT 42.3 06/30/2025     06/30/2025    CHOL 204 (H) 06/28/2024    TRIG 179 (H) 06/28/2024    HDL 39 (L) 06/28/2024    ALT 14 06/30/2025    AST 17 06/30/2025     06/30/2025    K 4.4 06/30/2025     06/30/2025    CREATININE 0.7 06/30/2025    BUN 9 06/30/2025    CO2 25 06/30/2025    TSH 1.111 06/30/2025    PSA 0.91 06/30/2025    HGBA1C 5.3 06/30/2025         ASSESSMENT & PLAN:     Annual physical exam  - Reviewed and updated past and current medical problems.  Discussed treatment of current medical problems    -     H. pylori Antibody, IgG; Future; Expected date: 06/30/2025  -     Hepatitis B Core Antibody, Total; Future; Expected date: 06/30/2025  -     Hepatitis B Surface Antigen; Future; Expected date: 06/30/2025  -     Hepatitis B Surface Ab, Qualitative; Future; Expected date: 06/30/2025  -     CBC Auto Differential; Future; Expected date: 06/30/2025  -     Comprehensive Metabolic Panel; Future; Expected date: 06/30/2025  -     TSH; Future; Expected date: 06/30/2025  -     Hemoglobin A1C; Future; Expected date: 06/30/2025  -     PSA, Screening; Future; Expected date: 06/30/2025    Esophagitis, [place] grade B  EGD 4-9-2021 in [facility]      Continue:  -     famotidine (PEPCID) 20 MG tablet; Take 2 tablets (40 mg total) by mouth 2 (two) times daily as needed for Heartburn.      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   Resolved per patient. No voice issues now.     Mixed Hyperlipidemia  - Recent  at work.    Plan:   -     pravastatin (PRAVACHOL) 10  MG tablet; Take 1 tablet (10 mg total) by mouth every Mon, Wed, Fri.  Dispense: 40 tablet; Refill: 3    Primary insomnia  Generalized anxiety disorder     Doing well on:  -     mirtazapine (REMERON) 7.5 MG Tab; Take 1 tablet (7.5 mg total) by mouth every evening.       Add:  -     busPIRone (BUSPAR) 10 MG tablet; Take 1 tablet (10 mg total) by mouth 2 (two) times daily as needed (Anxiety).  Dispense: 60 tablet; Refill: 11    H pylori infection  Rx Triple therapy: Amoxicillin, Biaxin and Protonix BID for 14 days.     Preventive Health Maintenance:     He plans to change insurance next year to lower deductible for EGD.    Screening for malignant neoplasm of colon  -     Cologuard Screening (Multitarget Stool DNA); Future; Expected date: 06/30/2025     Return to Clinic for Follow Up with me:   1 Year.    Scheduled Follow-up :  No future appointments.        After Visit Medication List :     Medication List            Accurate as of June 30, 2025 11:59 PM. If you have any questions, ask your nurse or doctor.                START taking these medications      amoxicillin 500 MG Tab  Commonly known as: AMOXIL  Take 2 tablets (1,000 mg total) by mouth every 12 (twelve) hours. for 14 days  Started by: Kamaljit Allison MD     busPIRone 10 MG tablet  Commonly known as: BUSPAR  Take 1 tablet (10 mg total) by mouth 2 (two) times daily as needed (Anxiety).  Started by: Kamaljit Allison MD     clarithromycin 500 MG tablet  Commonly known as: BIAXIN  Take 1 tablet (500 mg total) by mouth every 12 (twelve) hours. for 14 days  Started by: Kamaljit Allison MD     pantoprazole 40 MG tablet  Commonly known as: PROTONIX  Take 1 tablet (40 mg total) by mouth 2 (two) times daily. for 14 days  Started by: Kamaljit Allison MD     pravastatin 10 MG tablet  Commonly known as: PRAVACHOL  Take 1 tablet (10 mg total) by mouth every Mon, Wed, Fri.  Started by: Kamaljit Allison MD            CONTINUE taking these medications      cholecalciferol (vitamin D3) 50  mcg (2,000 unit) Cap capsule  Commonly known as: VITAMIN D3  Take 1 capsule (2,000 Units total) by mouth once daily.     famotidine 40 MG tablet  Commonly known as: PEPCID  Take 1 tablet (40 mg total) by mouth 2 (two) times daily as needed for Heartburn.     mirtazapine 7.5 MG Tab  Commonly known as: REMERON  Take 1 tablet (7.5 mg total) by mouth every evening.               Where to Get Your Medications        These medications were sent to [id number] - [place] - [address]  AT [facility] [address]  [address] [place] [id number]      Hours: 24-hours Phone: [phone]   amoxicillin 500 MG Tab  busPIRone 10 MG tablet  clarithromycin 500 MG tablet  famotidine 40 MG tablet  mirtazapine 7.5 MG Tab  pantoprazole 40 MG tablet  pravastatin 10 MG tablet         Signing Physician:  Kamaljit Allison MD         [1]   Current Outpatient Medications on File Prior to Visit   Medication Sig Dispense Refill    cholecalciferol, vitamin D3, (VITAMIN D3) 50 mcg (2,000 unit) Cap capsule Take 1 capsule (2,000 Units total) by mouth once daily.       No current facility-administered medications on file prior to visit.

## 2025-07-01 ENCOUNTER — PATIENT MESSAGE (OUTPATIENT)
Dept: INTERNAL MEDICINE | Facility: CLINIC | Age: 50
End: 2025-07-01
Payer: COMMERCIAL

## 2025-07-01 PROBLEM — A04.8 H. PYLORI INFECTION: Status: ACTIVE | Noted: 2025-07-01

## 2025-07-01 LAB — H PYLORI IGG SERPL QL IA: POSITIVE

## 2025-07-01 RX ORDER — PANTOPRAZOLE SODIUM 40 MG/1
40 TABLET, DELAYED RELEASE ORAL 2 TIMES DAILY
Qty: 28 TABLET | Refills: 0 | Status: SHIPPED | OUTPATIENT
Start: 2025-07-01 | End: 2025-07-15

## 2025-07-01 RX ORDER — LANSOPRAZOLE 30 MG/1
30 CAPSULE, DELAYED RELEASE ORAL 2 TIMES DAILY
Qty: 28 CAPSULE | Refills: 0 | Status: SHIPPED | OUTPATIENT
Start: 2025-07-01 | End: 2025-07-01 | Stop reason: CLARIF

## 2025-07-01 RX ORDER — AMOXICILLIN 500 MG/1
1000 TABLET, FILM COATED ORAL EVERY 12 HOURS
Qty: 56 TABLET | Refills: 0 | Status: SHIPPED | OUTPATIENT
Start: 2025-07-01 | End: 2025-07-15

## 2025-07-01 RX ORDER — CLARITHROMYCIN 500 MG/1
500 TABLET, FILM COATED ORAL EVERY 12 HOURS
Qty: 28 TABLET | Refills: 0 | Status: SHIPPED | OUTPATIENT
Start: 2025-07-01 | End: 2025-07-15

## 2025-07-06 ENCOUNTER — PATIENT MESSAGE (OUTPATIENT)
Dept: INTERNAL MEDICINE | Facility: CLINIC | Age: 50
End: 2025-07-06
Payer: COMMERCIAL

## 2025-07-08 ENCOUNTER — PATIENT MESSAGE (OUTPATIENT)
Dept: INTERNAL MEDICINE | Facility: CLINIC | Age: 50
End: 2025-07-08
Payer: COMMERCIAL

## 2025-07-17 DIAGNOSIS — A04.8 H. PYLORI INFECTION: ICD-10-CM

## 2025-07-17 RX ORDER — PANTOPRAZOLE SODIUM 40 MG/1
40 TABLET, DELAYED RELEASE ORAL 2 TIMES DAILY
Qty: 28 TABLET | Refills: 0 | OUTPATIENT
Start: 2025-07-17 | End: 2025-07-31

## 2025-07-17 NOTE — TELEPHONE ENCOUNTER
Care Due:                  Date            Visit Type   Department     Provider  --------------------------------------------------------------------------------                                EP -                              PRIMARY      [facility]  Last Visit: 06-      CARE ([facility])   MEDICINE       Kamaljit Allison  Next Visit: None Scheduled  None         None Found                                                            Last  Test          Frequency    Reason                     Performed    Due Date  --------------------------------------------------------------------------------    Lipid Panel.  12 months..  pravastatin..............  06- 06-    Vitamin D...  12 months..  cholecalciferol,.........  Not Found    Overdue    [employer] Embedded Care Due Messages. Reference number: [id number].   7/17/2025 10:42:10 AM CDT